# Patient Record
Sex: MALE | Race: ASIAN | NOT HISPANIC OR LATINO
[De-identification: names, ages, dates, MRNs, and addresses within clinical notes are randomized per-mention and may not be internally consistent; named-entity substitution may affect disease eponyms.]

---

## 2021-04-04 PROBLEM — Z00.00 ENCOUNTER FOR PREVENTIVE HEALTH EXAMINATION: Status: ACTIVE | Noted: 2021-04-04

## 2021-04-05 ENCOUNTER — NON-APPOINTMENT (OUTPATIENT)
Age: 41
End: 2021-04-05

## 2021-04-06 ENCOUNTER — APPOINTMENT (OUTPATIENT)
Dept: PULMONOLOGY | Facility: CLINIC | Age: 41
End: 2021-04-06

## 2021-05-18 ENCOUNTER — APPOINTMENT (OUTPATIENT)
Dept: PULMONOLOGY | Facility: CLINIC | Age: 41
End: 2021-05-18
Payer: COMMERCIAL

## 2021-05-18 VITALS
BODY MASS INDEX: 22.54 KG/M2 | HEART RATE: 73 BPM | OXYGEN SATURATION: 97 % | WEIGHT: 161 LBS | SYSTOLIC BLOOD PRESSURE: 127 MMHG | TEMPERATURE: 97.5 F | HEIGHT: 71 IN | DIASTOLIC BLOOD PRESSURE: 79 MMHG

## 2021-05-18 DIAGNOSIS — Z80.51 FAMILY HISTORY OF MALIGNANT NEOPLASM OF KIDNEY: ICD-10-CM

## 2021-05-18 DIAGNOSIS — Z82.0 FAMILY HISTORY OF EPILEPSY AND OTHER DISEASES OF THE NERVOUS SYSTEM: ICD-10-CM

## 2021-05-18 PROCEDURE — 99072 ADDL SUPL MATRL&STAF TM PHE: CPT

## 2021-05-18 PROCEDURE — 99203 OFFICE O/P NEW LOW 30 MIN: CPT

## 2021-05-19 PROBLEM — Z82.0 FAMILY HISTORY OF MIGRAINE HEADACHES: Status: ACTIVE | Noted: 2021-05-18

## 2021-05-19 PROBLEM — Z80.51 FAMILY HISTORY OF MALIGNANT NEOPLASM OF KIDNEY: Status: ACTIVE | Noted: 2021-05-18

## 2021-05-19 NOTE — HISTORY OF PRESENT ILLNESS
[Never] : never [TextBox_4] : 39yo man with no medical history referred by PCP with some concerns over sleep. Snores and this has been extensive for over 5 years. Has a hard time maintaining sleep; no issues falling asleep. Does take about 1 hour to fall back asleep after he wakes up in the middle of the night. Does feel sleepy during the day.  [ESS] : 12

## 2021-05-19 NOTE — CONSULT LETTER
[Dear  ___] : Dear  [unfilled], [Consult Letter:] : I had the pleasure of evaluating your patient, [unfilled]. [Please see my note below.] : Please see my note below. [Consult Closing:] : Thank you very much for allowing me to participate in the care of this patient.  If you have any questions, please do not hesitate to contact me. [Sincerely,] : Sincerely, [FreeTextEntry3] : Rafia White MD\par \par [employer] [facility]\par Pulmonary, Critical Care, and Sleep Medicine\par

## 2021-05-19 NOTE — ASSESSMENT
[FreeTextEntry1] : This is a 40 year old male referred by Dr. Francis Otero for evaluation of possible sleep apnea. The patient has multiple signs and symptoms of sleep-disordered breathing including snoring, sleep maintenance insomnia, and sleepiness during the day. He was referred to the [facility] for a diagnostic HST. The ramifications of KULWINDER and its potential therapeutic modalities were discussed with the patient. He will follow up with us after the HST.\par \par \par

## 2021-05-28 ENCOUNTER — APPOINTMENT (OUTPATIENT)
Dept: SLEEP CENTER | Facility: HOME HEALTH | Age: 41
End: 2021-05-28
Payer: COMMERCIAL

## 2021-05-28 ENCOUNTER — OUTPATIENT (OUTPATIENT)
Dept: OUTPATIENT SERVICES | Facility: HOSPITAL | Age: 41
LOS: 1 days | End: 2021-05-28
Payer: COMMERCIAL

## 2021-05-28 PROCEDURE — 95800 SLP STDY UNATTENDED: CPT

## 2021-05-28 PROCEDURE — 95800 SLP STDY UNATTENDED: CPT | Mod: 26

## 2021-06-01 ENCOUNTER — TRANSCRIPTION ENCOUNTER (OUTPATIENT)
Age: 41
End: 2021-06-01

## 2021-06-01 DIAGNOSIS — G47.33 OBSTRUCTIVE SLEEP APNEA (ADULT) (PEDIATRIC): ICD-10-CM

## 2021-06-26 ENCOUNTER — TRANSCRIPTION ENCOUNTER (OUTPATIENT)
Age: 41
End: 2021-06-26

## 2021-07-20 ENCOUNTER — APPOINTMENT (OUTPATIENT)
Dept: PULMONOLOGY | Facility: CLINIC | Age: 41
End: 2021-07-20

## 2021-07-23 ENCOUNTER — APPOINTMENT (OUTPATIENT)
Dept: PULMONOLOGY | Facility: CLINIC | Age: 41
End: 2021-07-23
Payer: COMMERCIAL

## 2021-07-23 PROCEDURE — 99213 OFFICE O/P EST LOW 20 MIN: CPT | Mod: 95

## 2021-07-23 NOTE — HISTORY OF PRESENT ILLNESS
[Home] : at home, [unfilled] , at the time of the visit. [Medical Office: ([facility])___] : at the medical office located in  [Verbal consent obtained from patient] : the patient, [unfilled] [Never] : never [TextBox_4] : 39yo man with no medical history referred by PCP with some concerns over sleep. Snores and this has been extensive for over 5 years. Has a hard time maintaining sleep; no issues falling asleep. Does take about 1 hour to fall back asleep after he wakes up in the middle of the night. Does feel sleepy during the day. \par \par 7/23/2021\par Had HST and would like to discuss results.  [ESS] : 12

## 2021-07-23 NOTE — CONSULT LETTER
[Dear  ___] : Dear  [unfilled], [Courtesy Letter:] : I had the pleasure of seeing your patient, [unfilled], in my office today. [Please see my note below.] : Please see my note below. [Consult Closing:] : Thank you very much for allowing me to participate in the care of this patient.  If you have any questions, please do not hesitate to contact me. [Sincerely,] : Sincerely, [FreeTextEntry3] : Rafia White MD\par \par [employer] at [facility]\par Pulmonary, Critical Care, and Sleep Medicine\par

## 2021-07-23 NOTE — ASSESSMENT
[FreeTextEntry1] : REVIEWED\par HST 5/2021: AHI 3.6; RDI 12.4; becki O2 saturation 92%\par \par 39yo man with persistent snoring. HST did not show significant KULWINDER but the RDI and AHI discrepancy is high. His symptoms are also out of proportion to the AHI result. Indicated for definitive testing with a PSG. Referred to the [facility]. Follow up after PSG is resulted.

## 2021-08-19 ENCOUNTER — OUTPATIENT (OUTPATIENT)
Dept: OUTPATIENT SERVICES | Facility: HOSPITAL | Age: 41
LOS: 1 days | End: 2021-08-19
Payer: COMMERCIAL

## 2021-08-19 ENCOUNTER — APPOINTMENT (OUTPATIENT)
Dept: SLEEP CENTER | Facility: HOSPITAL | Age: 41
End: 2021-08-19

## 2021-08-19 DIAGNOSIS — G47.33 OBSTRUCTIVE SLEEP APNEA (ADULT) (PEDIATRIC): ICD-10-CM

## 2021-08-19 PROCEDURE — 95810 POLYSOM 6/> YRS 4/> PARAM: CPT | Mod: 26

## 2021-08-19 PROCEDURE — 95810 POLYSOM 6/> YRS 4/> PARAM: CPT

## 2021-09-17 ENCOUNTER — APPOINTMENT (OUTPATIENT)
Dept: PULMONOLOGY | Facility: CLINIC | Age: 41
End: 2021-09-17
Payer: COMMERCIAL

## 2021-09-17 DIAGNOSIS — F51.04 PSYCHOPHYSIOLOGIC INSOMNIA: ICD-10-CM

## 2021-09-17 DIAGNOSIS — R06.83 SNORING: ICD-10-CM

## 2021-09-17 PROCEDURE — 99443: CPT

## 2021-09-17 NOTE — HISTORY OF PRESENT ILLNESS
[Home] : at home, [unfilled] , at the time of the visit. [Medical Office: ([facility])___] : at the medical office located in  [Verbal consent obtained from patient] : the patient, [unfilled] [Never] : never [TextBox_4] : 41yo man with no medical history referred by PCP with some concerns over sleep. Snores and this has been extensive for over 5 years. Has a hard time maintaining sleep; no issues falling asleep. Does take about 1 hour to fall back asleep after he wakes up in the middle of the night. Does feel sleepy during the day. \par \par 7/23/2021\par Had HST and would like to discuss results. \par \par 9/17/2021\par Had PSG and would like to discuss results.  [ESS] : 12

## 2021-09-17 NOTE — ASSESSMENT
[FreeTextEntry1] : REVIEWED\par HST 5/2021: AHI 3.6; RDI 12.4; becki O2 saturation 92%\par PSG 8/2021: AHI 5.5; becki O2 saturation 75; 2.3 min T88; PLMs w/o arousals\par \par 39yo man with snoring and insomnia. HST did not show significant KULWINDER but the RDI and AHI discrepancy was high so PSG was pursued. PSG shows very mild KULWINDER and PLMS w/o arousals. He had a TST of more than 6 hours, and thought he slept for only 3 hours. Also had appropriate REM and NREM cycles with minimal awakenings on the hypnogram. He has paradoxical insomnia which is exacerbated by an uncomfortable sleep environment because his wife snores. Encouraged him to use ear plugs, white/pink noise, etc to minimize disturbances. Also discussed having appropriate sleep related expectations. Follow up in 8 weeks.